# Patient Record
Sex: FEMALE | Race: WHITE | NOT HISPANIC OR LATINO | Employment: OTHER | URBAN - METROPOLITAN AREA
[De-identification: names, ages, dates, MRNs, and addresses within clinical notes are randomized per-mention and may not be internally consistent; named-entity substitution may affect disease eponyms.]

---

## 2019-03-13 PROBLEM — K21.00 GERD WITH ESOPHAGITIS: Status: ACTIVE | Noted: 2019-03-13

## 2019-03-13 PROBLEM — I10 MODERATE HYPERTENSION: Status: ACTIVE | Noted: 2019-03-13

## 2019-03-19 ENCOUNTER — TELEPHONE (OUTPATIENT)
Dept: NEUROSURGERY | Facility: CLINIC | Age: 64
End: 2019-03-19

## 2019-03-19 NOTE — TELEPHONE ENCOUNTER
"Spoke with the pt, expalined to the pt we do have the referral and I would need to schedule an appt with ANKITA Fairchild in order for the proper imaging to be ordered prior to scheduling with Dr. Valentine. The p asked me how much the MRI would cost, I explained I did not know but she could contact her insurance company to discuss. She then asked how much it was to see the PA, then became angry and said "nevermind, just cancel it".    "

## 2019-03-26 ENCOUNTER — INITIAL CONSULT (OUTPATIENT)
Dept: NEUROSURGERY | Facility: CLINIC | Age: 64
End: 2019-03-26
Payer: MEDICARE

## 2019-03-26 VITALS
SYSTOLIC BLOOD PRESSURE: 147 MMHG | BODY MASS INDEX: 36.46 KG/M2 | HEART RATE: 75 BPM | DIASTOLIC BLOOD PRESSURE: 91 MMHG | TEMPERATURE: 99 F | HEIGHT: 66 IN | WEIGHT: 226.88 LBS

## 2019-03-26 DIAGNOSIS — M54.16 LUMBAR RADICULOPATHY: Primary | ICD-10-CM

## 2019-03-26 PROCEDURE — 1101F PT FALLS ASSESS-DOCD LE1/YR: CPT | Mod: S$GLB,,, | Performed by: PHYSICIAN ASSISTANT

## 2019-03-26 PROCEDURE — 1101F PR PT FALLS ASSESS DOC 0-1 FALLS W/OUT INJ PAST YR: ICD-10-PCS | Mod: S$GLB,,, | Performed by: PHYSICIAN ASSISTANT

## 2019-03-26 PROCEDURE — 3008F PR BODY MASS INDEX (BMI) DOCUMENTED: ICD-10-PCS | Mod: S$GLB,,, | Performed by: PHYSICIAN ASSISTANT

## 2019-03-26 PROCEDURE — 99204 PR OFFICE/OUTPT VISIT, NEW, LEVL IV, 45-59 MIN: ICD-10-PCS | Mod: S$GLB,,, | Performed by: PHYSICIAN ASSISTANT

## 2019-03-26 PROCEDURE — 99999 PR PBB SHADOW E&M-EST. PATIENT-LVL IV: CPT | Mod: PBBFAC,,, | Performed by: PHYSICIAN ASSISTANT

## 2019-03-26 PROCEDURE — 3008F BODY MASS INDEX DOCD: CPT | Mod: S$GLB,,, | Performed by: PHYSICIAN ASSISTANT

## 2019-03-26 PROCEDURE — 3080F DIAST BP >= 90 MM HG: CPT | Mod: S$GLB,,, | Performed by: PHYSICIAN ASSISTANT

## 2019-03-26 PROCEDURE — 3077F PR MOST RECENT SYSTOLIC BLOOD PRESSURE >= 140 MM HG: ICD-10-PCS | Mod: S$GLB,,, | Performed by: PHYSICIAN ASSISTANT

## 2019-03-26 PROCEDURE — 3080F PR MOST RECENT DIASTOLIC BLOOD PRESSURE >= 90 MM HG: ICD-10-PCS | Mod: S$GLB,,, | Performed by: PHYSICIAN ASSISTANT

## 2019-03-26 PROCEDURE — 99204 OFFICE O/P NEW MOD 45 MIN: CPT | Mod: S$GLB,,, | Performed by: PHYSICIAN ASSISTANT

## 2019-03-26 PROCEDURE — 3077F SYST BP >= 140 MM HG: CPT | Mod: S$GLB,,, | Performed by: PHYSICIAN ASSISTANT

## 2019-03-26 PROCEDURE — 99999 PR PBB SHADOW E&M-EST. PATIENT-LVL IV: ICD-10-PCS | Mod: PBBFAC,,, | Performed by: PHYSICIAN ASSISTANT

## 2019-03-26 NOTE — LETTER
March 29, 2019      Wilian Sutton, DO  [address]  [address]  [place] [address]           [facility]  [address] [facility] [address] [place] [address]  Phone: [phone]  Fax: [phone]          Patient: Sharon A Bosworth   MR Number: [id number]   YOB: 1955   Date of Visit: 3/26/2019       Dear Dr. Wilian Sutton:    Thank you for referring Sharon Bosworth to me for evaluation. Attached you will find relevant portions of my assessment and plan of care.    If you have questions, please do not hesitate to call me. I look forward to following Sharon Bosworth along with you.    Sincerely,    Zackary Jerome  CC:  No Recipients    If you would like to receive this communication electronically, please contact [email or web address] or [phone] to request more information on [facility] access.    For providers and/or their staff who would like to refer a patient to [facility], please contact us through our one-stop-shop provider referral line, Natalie Marsh, at [phone].    If you feel you have received this communication in error or would no longer like to receive these types of communications, please e-mail [email or web address]

## 2019-04-05 NOTE — PROGRESS NOTES
Neurosurgery History & Physical    Patient ID: Sharon A Bosworth is a 63 y.o. female.    Chief Complaint   Patient presents with    Lumbar Spine Pain (L-Spine)     Long history of worsening low back pain noting pain to her RLE mostly in the thigh.        Review of Systems   Constitutional: Negative for chills, diaphoresis, fatigue and fever.   HENT: Negative for congestion, ear pain, rhinorrhea, sneezing, sore throat and tinnitus.    Eyes: Negative for photophobia, pain, redness and visual disturbance.   Respiratory: Negative for cough, chest tightness, shortness of breath and wheezing.    Cardiovascular: Negative for chest pain, palpitations and leg swelling.   Gastrointestinal: Negative for abdominal distention, abdominal pain, constipation, diarrhea, nausea and vomiting.   Genitourinary: Negative for difficulty urinating, dysuria, frequency and urgency.   Musculoskeletal: Positive for back pain. Negative for gait problem, myalgias and neck pain.   Skin: Negative for pallor and rash.   Neurological: Negative for dizziness, seizures, speech difficulty, weakness, numbness and headaches.   Psychiatric/Behavioral: Negative for confusion and hallucinations.   + right leg pain    History reviewed. No pertinent past medical history.  Social History     Socioeconomic History    Marital status: Single     Spouse name: Not on file    Number of children: Not on file    Years of education: Not on file    Highest education level: Not on file   Occupational History    Not on file   Social Needs    Financial resource strain: Not on file    Food insecurity:     Worry: Not on file     Inability: Not on file    Transportation needs:     Medical: Not on file     Non-medical: Not on file   Tobacco Use    Smoking status: Never Smoker    Smokeless tobacco: Never Used   Substance and Sexual Activity    Alcohol use: Yes     Alcohol/week: 0.0 oz     Comment: Socially    Drug use: No    Sexual activity: Not on file  "  Lifestyle    Physical activity:     Days per week: Not on file     Minutes per session: Not on file    Stress: Not on file   Relationships    Social connections:     Talks on phone: Not on file     Gets together: Not on file     Attends Oriental orthodox service: Not on file     Active member of club or organization: Not on file     Attends meetings of clubs or organizations: Not on file     Relationship status: Not on file   Other Topics Concern    Not on file   Social History Narrative    Not on file     Family History   Problem Relation Age of Onset    Hypertension Mother     Hypertension Brother     Breast cancer Sister 68     Review of patient's allergies indicates:  No Known Allergies    Current Outpatient Medications:     chlorthalidone (HYGROTEN) 50 MG Tab, Take 25 mg by mouth once daily., Disp: , Rfl:     fluticasone (FLONASE) 50 mcg/actuation nasal spray, 1 spray (50 mcg total) by Each Nare route 2 (two) times daily as needed for Rhinitis., Disp: 1 Bottle, Rfl: 4    HYDROcodone-acetaminophen (NORCO)  mg per tablet, Tab 1/2 to 1 po every 8 hours prn back/right leg pain.  May cause drowsiness/confusion.  Take with food., Disp: 20 tablet, Rfl: 0    losartan (COZAAR) 100 MG tablet, Take 1 tablet (100 mg total) by mouth once daily., Disp: 90 tablet, Rfl: 3    pantoprazole (PROTONIX) 40 MG tablet, Take 1 tablet (40 mg total) by mouth once daily., Disp: 90 tablet, Rfl: 3  Blood pressure (!) 147/91, pulse 75, temperature 98.6 °F (37 °C), temperature source Oral, height 5' 6" (1.676 m), weight 102.9 kg (226 lb 13.7 oz).      Neurologic Exam     Mental Status   Oriented to person, place, and time.   Oriented to person.   Oriented to place.   Oriented to time.   Follows 3 step commands.   Attention: normal. Concentration: normal.   Speech: speech is normal   Level of consciousness: alert  Knowledge: consistent with education.   Able to name object. Able to read. Able to repeat. Able to write. Normal " comprehension.      Cranial Nerves      CN II   Visual acuity: normal  Right visual field deficit: none  Left visual field deficit: none      CN III, IV, VI   Pupils are equal, round, and reactive to light.  Right pupil: Size: 3 mm. Shape: regular. Reactivity: brisk. Consensual response: intact.   Left pupil: Size: 3 mm. Shape: regular. Reactivity: brisk. Consensual response: intact.   CN III: no CN III palsy  CN VI: no CN VI palsy  Nystagmus: none   Diplopia: none  Ophthalmoparesis: none  Conjugate gaze: present     CN V   Right facial sensation deficit: none  Left facial sensation deficit: none     CN VII   Right facial weakness: none  Left facial weakness: none     CN VIII   Hearing: intact     CN IX, X   CN IX normal.   CN X normal.      CN XI   Right sternocleidomastoid strength: normal  Left sternocleidomastoid strength: normal  Right trapezius strength: normal  Left trapezius strength: normal     CN XII   Fasciculations: absent  Tongue deviation: none     Motor Exam   Muscle bulk: normal  Overall muscle tone: normal  Right arm pronator drift: absent  Left arm pronator drift: absent     Strength   Right deltoid: 5/5  Left deltoid: 5/5  Right biceps: 5/5  Left biceps: 5/5  Right triceps: 5/5  Left triceps: 5/5  Right wrist flexion: 5/5  Left wrist flexion: 5/5  Right wrist extension: 5/5  Left wrist extension: 5/5  Right interossei: 5/5  Left interossei: 5/5  Right iliopsoas: 5/5  Left iliopsoas: 5/5  Right quadriceps: 5/5  Left quadriceps: 5/5  Right hamstrin/5  Left hamstrin/5  Right anterior tibial: 5/5  Left anterior tibial: 5/5  Right posterior tibial: 5/5  Left posterior tibial: 5/5  Right peroneal: 5/5  Left peroneal: 5/5  Right gastroc: 5/5  Left gastroc: 5/5     Sensory Exam   Right arm light touch: normal  Left arm light touch: normal  Right leg light touch: normal  Left leg light touch: normal     Gait, Coordination, and Reflexes      Gait  Gait: normal      Coordination   Romberg:  negative  Finger to nose coordination: normal  Heel to shin coordination: normal  Tandem walking coordination: normal     Tremor   Resting tremor: absent  Intention tremor: absent  Action tremor: absent     Reflexes   Right brachioradialis: 2+  Left brachioradialis: 2+  Right biceps: 2+  Left biceps: 2+  Right triceps: 2+  Left triceps: 2+  Right patellar: 2+  Left patellar: 2+  Right achilles: 1+  Left achilles: 1+  Right Keller: absent  Left Keller: absent  Right ankle clonus: absent  Left ankle clonus: absent    Physical Exam  Constitutional: Oriented to person, place, and time. Appears well-developed and well-nourished.   HENT:   Head: Normocephalic and atraumatic.   Eyes: Pupils are equal, round, and reactive to light.   Neck: Normal range of motion. Neck supple.   Cardiovascular: Normal rate.    Pulmonary/Chest: Effort normal.   Musculoskeletal: Normal range of motion. Exhibits no edema.   Neurological: Alert and oriented to person, place, and time. Normal Finger-Nose-Finger Test, a normal Heel to Shin Test, a normal Romberg Test and a normal Tandem Gait Test. Gait normal.   Reflex Scores:       Tricep reflexes are 2+ on the right side and 2+ on the left side.       Bicep reflexes are 2+ on the right side and 2+ on the left side.       Brachioradialis reflexes are 2+ on the right side and 2+ on the left side.       Patellar reflexes are 2+ on the right side and 2+ on the left side.       Achilles reflexes are 1+ on the right side and 1+ on the left side.  Skin: Skin is warm, dry and intact.   Psychiatric: Normal mood and affect. Speech is normal and behavior is normal. Judgment and thought content normal.   Nursing note and vitals reviewed.     Oswestry Disability Index score: 38%    Patient Health Questionnaire score: 9    Provider dictation:    Ms. Sharon Bosworth is a 63 year old right hand dominant female who presents for neurosurgical consultation referred by Dr. Sutton for evaluation of lower back pain.  Patient reports severe lower back pain that started on 3/11. She was seen at [facility] and x-rays were done. She was sent home. Pain was not improving and she was then seen by Dr. Sutton on 3/13. She was provided Norco, Flexeril, and a medrol dose pack. Patient was referred here for further work-up. She reports mild relief with medications. Currently she has lower back pain that shoots into the right anterior/lateral thigh. She also reports spasms. Denies lower extremity weakness or numbness. Denies bowel/bladder incontinence. Denies prior PT or TARAH for the pain.     On exam patient has full strength and no sensory deficits.     No imaging to review.     Ms. Bosworth presents with new onset of lower back pain and right leg pain about 3 weeks ago. I am ordering an MRI Lumbar spine and x-rays with flex/ext to further assess. She will follow-up with me once imaging is complete. Patient was informed to contact the clinic with any further questions or concerns.     1. Lumbar radiculopathy  MRI Lumbar Spine Without Contrast    X-Ray Lumbar Complete With Flex And Ext

## 2019-04-10 ENCOUNTER — HOSPITAL ENCOUNTER (OUTPATIENT)
Dept: RADIOLOGY | Facility: HOSPITAL | Age: 64
Discharge: HOME | End: 2019-04-10
Attending: PHYSICIAN ASSISTANT
Payer: MEDICARE

## 2019-04-10 DIAGNOSIS — M54.16 LUMBAR RADICULOPATHY: ICD-10-CM

## 2019-04-10 PROCEDURE — 72110 XR LUMBAR SPINE 5 VIEW WITH FLEX AND EXT: ICD-10-PCS | Mod: 26,,, | Performed by: RADIOLOGY

## 2019-04-10 PROCEDURE — 72110 X-RAY EXAM L-2 SPINE 4/>VWS: CPT | Mod: 26,,, | Performed by: RADIOLOGY

## 2019-04-10 PROCEDURE — 72148 MRI LUMBAR SPINE W/O DYE: CPT | Mod: TC,PO

## 2019-04-10 PROCEDURE — 72148 MRI LUMBAR SPINE WITHOUT CONTRAST: ICD-10-PCS | Mod: 26,,, | Performed by: RADIOLOGY

## 2019-04-10 PROCEDURE — 72110 X-RAY EXAM L-2 SPINE 4/>VWS: CPT | Mod: TC,FY,PO

## 2019-04-10 PROCEDURE — 72148 MRI LUMBAR SPINE W/O DYE: CPT | Mod: 26,,, | Performed by: RADIOLOGY

## 2019-04-15 ENCOUNTER — OFFICE VISIT (OUTPATIENT)
Dept: NEUROSURGERY | Facility: CLINIC | Age: 64
End: 2019-04-15
Payer: MEDICARE

## 2019-04-15 VITALS
HEART RATE: 80 BPM | DIASTOLIC BLOOD PRESSURE: 87 MMHG | TEMPERATURE: 98 F | HEIGHT: 66 IN | SYSTOLIC BLOOD PRESSURE: 142 MMHG | WEIGHT: 229.06 LBS | BODY MASS INDEX: 36.81 KG/M2

## 2019-04-15 DIAGNOSIS — M47.816 LUMBAR SPONDYLOSIS: Primary | ICD-10-CM

## 2019-04-15 PROCEDURE — 3077F SYST BP >= 140 MM HG: CPT | Mod: S$GLB,,, | Performed by: PHYSICIAN ASSISTANT

## 2019-04-15 PROCEDURE — 3008F BODY MASS INDEX DOCD: CPT | Mod: S$GLB,,, | Performed by: PHYSICIAN ASSISTANT

## 2019-04-15 PROCEDURE — 1101F PR PT FALLS ASSESS DOC 0-1 FALLS W/OUT INJ PAST YR: ICD-10-PCS | Mod: S$GLB,,, | Performed by: PHYSICIAN ASSISTANT

## 2019-04-15 PROCEDURE — 1101F PT FALLS ASSESS-DOCD LE1/YR: CPT | Mod: S$GLB,,, | Performed by: PHYSICIAN ASSISTANT

## 2019-04-15 PROCEDURE — 3077F PR MOST RECENT SYSTOLIC BLOOD PRESSURE >= 140 MM HG: ICD-10-PCS | Mod: S$GLB,,, | Performed by: PHYSICIAN ASSISTANT

## 2019-04-15 PROCEDURE — 99999 PR PBB SHADOW E&M-EST. PATIENT-LVL IV: ICD-10-PCS | Mod: PBBFAC,,, | Performed by: PHYSICIAN ASSISTANT

## 2019-04-15 PROCEDURE — 3008F PR BODY MASS INDEX (BMI) DOCUMENTED: ICD-10-PCS | Mod: S$GLB,,, | Performed by: PHYSICIAN ASSISTANT

## 2019-04-15 PROCEDURE — 99214 PR OFFICE/OUTPT VISIT, EST, LEVL IV, 30-39 MIN: ICD-10-PCS | Mod: S$GLB,,, | Performed by: PHYSICIAN ASSISTANT

## 2019-04-15 PROCEDURE — 3079F DIAST BP 80-89 MM HG: CPT | Mod: S$GLB,,, | Performed by: PHYSICIAN ASSISTANT

## 2019-04-15 PROCEDURE — 99999 PR PBB SHADOW E&M-EST. PATIENT-LVL IV: CPT | Mod: PBBFAC,,, | Performed by: PHYSICIAN ASSISTANT

## 2019-04-15 PROCEDURE — 99214 OFFICE O/P EST MOD 30 MIN: CPT | Mod: S$GLB,,, | Performed by: PHYSICIAN ASSISTANT

## 2019-04-15 PROCEDURE — 3079F PR MOST RECENT DIASTOLIC BLOOD PRESSURE 80-89 MM HG: ICD-10-PCS | Mod: S$GLB,,, | Performed by: PHYSICIAN ASSISTANT

## 2019-04-17 NOTE — PROGRESS NOTES
Neurosurgery History & Physical    Patient ID: Sharon A Bosworth is a 63 y.o. female.    Chief Complaint   Patient presents with    Follow-up     LBP follow up with MRI. Denies changes since last visit.        Review of Systems  Constitutional: Negative for chills, diaphoresis, fatigue and fever.   HENT: Negative for congestion, ear pain, rhinorrhea, sneezing, sore throat and tinnitus.    Eyes: Negative for photophobia, pain, redness and visual disturbance.   Respiratory: Negative for cough, chest tightness, shortness of breath and wheezing.    Cardiovascular: Negative for chest pain, palpitations and leg swelling.   Gastrointestinal: Negative for abdominal distention, abdominal pain, constipation, diarrhea, nausea and vomiting.   Genitourinary: Negative for difficulty urinating, dysuria, frequency and urgency.   Musculoskeletal: Positive for back pain. Negative for gait problem, myalgias and neck pain.   Skin: Negative for pallor and rash.   Neurological: Negative for dizziness, seizures, speech difficulty, weakness, numbness and headaches.   Psychiatric/Behavioral: Negative for confusion and hallucinations.   + right leg pain    History reviewed. No pertinent past medical history.  Social History     Socioeconomic History    Marital status: Single     Spouse name: Not on file    Number of children: Not on file    Years of education: Not on file    Highest education level: Not on file   Occupational History    Not on file   Social Needs    Financial resource strain: Not on file    Food insecurity:     Worry: Not on file     Inability: Not on file    Transportation needs:     Medical: Not on file     Non-medical: Not on file   Tobacco Use    Smoking status: Never Smoker    Smokeless tobacco: Never Used   Substance and Sexual Activity    Alcohol use: Yes     Alcohol/week: 0.0 oz     Comment: Socially    Drug use: No    Sexual activity: Not on file   Lifestyle    Physical activity:     Days per week:  "Not on file     Minutes per session: Not on file    Stress: Not on file   Relationships    Social connections:     Talks on phone: Not on file     Gets together: Not on file     Attends Faith service: Not on file     Active member of club or organization: Not on file     Attends meetings of clubs or organizations: Not on file     Relationship status: Not on file   Other Topics Concern    Not on file   Social History Narrative    Not on file     Family History   Problem Relation Age of Onset    Hypertension Mother     Hypertension Brother     Breast cancer Sister 68     Review of patient's allergies indicates:  No Known Allergies    Current Outpatient Medications:     chlorthalidone (HYGROTEN) 50 MG Tab, Take 25 mg by mouth once daily., Disp: , Rfl:     fluticasone (FLONASE) 50 mcg/actuation nasal spray, 1 spray (50 mcg total) by Each Nare route 2 (two) times daily as needed for Rhinitis., Disp: 1 Bottle, Rfl: 4    HYDROcodone-acetaminophen (NORCO)  mg per tablet, Tab 1/2 to 1 po every 8 hours prn back/right leg pain.  May cause drowsiness/confusion.  Take with food., Disp: 20 tablet, Rfl: 0    losartan (COZAAR) 100 MG tablet, Take 1 tablet (100 mg total) by mouth once daily., Disp: 90 tablet, Rfl: 3    pantoprazole (PROTONIX) 40 MG tablet, Take 1 tablet (40 mg total) by mouth once daily., Disp: 90 tablet, Rfl: 3  Blood pressure (!) 142/87, pulse 80, temperature 98.2 °F (36.8 °C), temperature source Oral, height 5' 6" (1.676 m), weight 103.9 kg (229 lb 0.9 oz).      Neurologic Exam  Mental Status   Oriented to person, place, and time.   Oriented to person.   Oriented to place.   Oriented to time.   Follows 3 step commands.   Attention: normal. Concentration: normal.   Speech: speech is normal   Level of consciousness: alert  Knowledge: consistent with education.   Able to name object. Able to read. Able to repeat. Able to write. Normal comprehension.      Cranial Nerves      CN II   Visual " acuity: normal  Right visual field deficit: none  Left visual field deficit: none      CN III, IV, VI   Pupils are equal, round, and reactive to light.  Right pupil: Size: 3 mm. Shape: regular. Reactivity: brisk. Consensual response: intact.   Left pupil: Size: 3 mm. Shape: regular. Reactivity: brisk. Consensual response: intact.   CN III: no CN III palsy  CN VI: no CN VI palsy  Nystagmus: none   Diplopia: none  Ophthalmoparesis: none  Conjugate gaze: present     CN V   Right facial sensation deficit: none  Left facial sensation deficit: none     CN VII   Right facial weakness: none  Left facial weakness: none     CN VIII   Hearing: intact     CN IX, X   CN IX normal.   CN X normal.      CN XI   Right sternocleidomastoid strength: normal  Left sternocleidomastoid strength: normal  Right trapezius strength: normal  Left trapezius strength: normal     CN XII   Fasciculations: absent  Tongue deviation: none     Motor Exam   Muscle bulk: normal  Overall muscle tone: normal  Right arm pronator drift: absent  Left arm pronator drift: absent     Strength   Right deltoid: 5/5  Left deltoid: 5/5  Right biceps: 5/5  Left biceps: 5/5  Right triceps: 5/5  Left triceps: 5/5  Right wrist flexion: 5/5  Left wrist flexion: 5/5  Right wrist extension: 5/5  Left wrist extension: 5/5  Right interossei: 5/5  Left interossei: 5/5  Right iliopsoas: 5/5  Left iliopsoas: 5/5  Right quadriceps: 5/5  Left quadriceps: 5/5  Right hamstrin/5  Left hamstrin/5  Right anterior tibial: 5/5  Left anterior tibial: 5/5  Right posterior tibial: 5/5  Left posterior tibial: 5/5  Right peroneal: 5/5  Left peroneal: 5/5  Right gastroc: 5/5  Left gastroc: 5/5     Sensory Exam   Right arm light touch: normal  Left arm light touch: normal  Right leg light touch: hyperesthesia anterior/lateral thigh  Left leg light touch: normal     Gait, Coordination, and Reflexes      Gait  Gait: normal      Coordination   Romberg: negative  Finger to nose  coordination: normal  Heel to shin coordination: normal  Tandem walking coordination: normal     Tremor   Resting tremor: absent  Intention tremor: absent  Action tremor: absent     Reflexes   Right brachioradialis: 2+  Left brachioradialis: 2+  Right biceps: 2+  Left biceps: 2+  Right triceps: 2+  Left triceps: 2+  Right patellar: 2+  Left patellar: 2+  Right achilles: 1+  Left achilles: 1+  Right Keller: absent  Left Keller: absent  Right ankle clonus: absent  Left ankle clonus: absent    Physical Exam  Constitutional: Oriented to person, place, and time. Appears well-developed and well-nourished.   HENT:   Head: Normocephalic and atraumatic.   Eyes: Pupils are equal, round, and reactive to light.   Neck: Normal range of motion. Neck supple.   Cardiovascular: Normal rate.    Pulmonary/Chest: Effort normal.   Musculoskeletal: Normal range of motion. Exhibits no edema.   Neurological: Alert and oriented to person, place, and time. Normal Finger-Nose-Finger Test, a normal Heel to Shin Test, a normal Romberg Test and a normal Tandem Gait Test. Gait normal.   Reflex Scores:       Tricep reflexes are 2+ on the right side and 2+ on the left side.       Bicep reflexes are 2+ on the right side and 2+ on the left side.       Brachioradialis reflexes are 2+ on the right side and 2+ on the left side.       Patellar reflexes are 2+ on the right side and 2+ on the left side.       Achilles reflexes are 1+ on the right side and 1+ on the left side.  Skin: Skin is warm, dry and intact.   Psychiatric: Normal mood and affect. Speech is normal and behavior is normal. Judgment and thought content normal.   Nursing note and vitals reviewed.     Oswestry Disability Index score: 38%    Patient Health Questionnaire score: 9    Provider dictation:    Ms. Sharon Bosworth is a 63 year old right hand dominant female who presents for neurosurgical follow-up of lower back pain. I initially saw her in clinic on 3/26/2019. At that time patient  reported severe lower back pain that started on 3/11. She was seen at [facility] and x-rays were done. She was sent home. Pain was not improving and she was then seen by Dr. Sutton on 3/13. She was provided Norco, Flexeril, and a medrol dose pack. Patient reports lower back pain that shoots into the right anterior/lateral thigh. Pain has worsened since last visit. She also reports spasms. Denies lower extremity weakness or numbness. Denies bowel/bladder incontinence. Denies prior PT or TARAH for the pain. I ordered an MRI Lumbar spine and flex/ext x-rays. Patient is here today for follow-up to review imaging.      On exam patient has full strength. Hyperesthesia in right anterior/lateral thigh.     MR Lumbar spine personally reviewed and shows L4-5 disc bulge, facet arthropathy, and ligamentum flavum hypertrophy causing mild central and mild bilateral foraminal stenosis.   X-ray lumbar spine with flex/ext personally reviewed and shows no abnormal motion with flex/ext views.      Ms. Bosworth presents with worsening leg pain and imaging shows mild lumbar spondylosis at L4-5. I am referring her to physical therapy and pain management for evaluation and treatment. No surgical intervention recommended at this time. I will also obtain an EMG/NCS of bilateral lower extremities and will call her with the results once completed. She will follow-up on a prn basis. Patient was informed to call the clinic with any further questions or concerns.       1. Lumbar spondylosis  Ambulatory Referral to Physical/Occupational Therapy    EMG W/ ULTRASOUND AND NERVE CONDUCTION TEST 2 Extremities    Ambulatory consult to Pain Clinic

## 2019-05-14 ENCOUNTER — OFFICE VISIT (OUTPATIENT)
Dept: PAIN MEDICINE | Facility: CLINIC | Age: 64
End: 2019-05-14
Payer: MEDICARE

## 2019-05-14 VITALS
WEIGHT: 229.06 LBS | DIASTOLIC BLOOD PRESSURE: 68 MMHG | HEIGHT: 66 IN | SYSTOLIC BLOOD PRESSURE: 106 MMHG | BODY MASS INDEX: 36.81 KG/M2 | HEART RATE: 75 BPM

## 2019-05-14 DIAGNOSIS — M51.36 DDD (DEGENERATIVE DISC DISEASE), LUMBAR: ICD-10-CM

## 2019-05-14 DIAGNOSIS — M54.16 LUMBAR RADICULOPATHY: Primary | ICD-10-CM

## 2019-05-14 PROCEDURE — 99204 PR OFFICE/OUTPT VISIT, NEW, LEVL IV, 45-59 MIN: ICD-10-PCS | Mod: S$GLB,,, | Performed by: ANESTHESIOLOGY

## 2019-05-14 PROCEDURE — 3008F BODY MASS INDEX DOCD: CPT | Mod: S$GLB,,, | Performed by: ANESTHESIOLOGY

## 2019-05-14 PROCEDURE — 1101F PR PT FALLS ASSESS DOC 0-1 FALLS W/OUT INJ PAST YR: ICD-10-PCS | Mod: S$GLB,,, | Performed by: ANESTHESIOLOGY

## 2019-05-14 PROCEDURE — 99204 OFFICE O/P NEW MOD 45 MIN: CPT | Mod: S$GLB,,, | Performed by: ANESTHESIOLOGY

## 2019-05-14 PROCEDURE — 99999 PR PBB SHADOW E&M-EST. PATIENT-LVL IV: ICD-10-PCS | Mod: PBBFAC,,, | Performed by: ANESTHESIOLOGY

## 2019-05-14 PROCEDURE — 3078F DIAST BP <80 MM HG: CPT | Mod: S$GLB,,, | Performed by: ANESTHESIOLOGY

## 2019-05-14 PROCEDURE — 3074F PR MOST RECENT SYSTOLIC BLOOD PRESSURE < 130 MM HG: ICD-10-PCS | Mod: S$GLB,,, | Performed by: ANESTHESIOLOGY

## 2019-05-14 PROCEDURE — 99999 PR PBB SHADOW E&M-EST. PATIENT-LVL IV: CPT | Mod: PBBFAC,,, | Performed by: ANESTHESIOLOGY

## 2019-05-14 PROCEDURE — 3074F SYST BP LT 130 MM HG: CPT | Mod: S$GLB,,, | Performed by: ANESTHESIOLOGY

## 2019-05-14 PROCEDURE — 3078F PR MOST RECENT DIASTOLIC BLOOD PRESSURE < 80 MM HG: ICD-10-PCS | Mod: S$GLB,,, | Performed by: ANESTHESIOLOGY

## 2019-05-14 PROCEDURE — 3008F PR BODY MASS INDEX (BMI) DOCUMENTED: ICD-10-PCS | Mod: S$GLB,,, | Performed by: ANESTHESIOLOGY

## 2019-05-14 PROCEDURE — 1101F PT FALLS ASSESS-DOCD LE1/YR: CPT | Mod: S$GLB,,, | Performed by: ANESTHESIOLOGY

## 2019-05-14 RX ORDER — GABAPENTIN 300 MG/1
300 CAPSULE ORAL 3 TIMES DAILY
Qty: 90 CAPSULE | Refills: 1 | Status: SHIPPED | OUTPATIENT
Start: 2019-05-14 | End: 2019-05-14

## 2019-05-14 RX ORDER — GABAPENTIN 300 MG/1
300 CAPSULE ORAL NIGHTLY
Qty: 30 CAPSULE | Refills: 1 | Status: SHIPPED | OUTPATIENT
Start: 2019-05-14 | End: 2020-03-02

## 2019-05-14 RX ORDER — ALPRAZOLAM 0.5 MG/1
1 TABLET, ORALLY DISINTEGRATING ORAL ONCE AS NEEDED
Status: CANCELLED | OUTPATIENT
Start: 2019-05-29 | End: 2030-10-24

## 2019-05-14 NOTE — H&P (VIEW-ONLY)
This note was completed with dictation software and grammatical errors may exist.    CC: Back pain, right anterior thigh pain    HPI:  The patient is a 63-year-old woman with a history of hypertension, presents in referral [facility] for back and right anterior thigh pain. The patient reports that her thigh pain began 2-3 months ago out of the blue, denies any trauma or incidents which may have caused the pain. It has become more constant and more severe.  It is located across the anterior thigh from the lateral hip down to the knee and she describes it as burning, throbbing, aching, numb, sharp, shooting and tingling.  She states that it is fairly constant and it does not seem that anything specifically makes it better or worse but she does report slight worsening at times with sitting too long, standing too long, lying down too long or with walking.  While she denies any major numbness or constant pain below her knee, she does report jolts of pain that will radiate along the anterior thigh to the ankle but not into the foot.  She denies any weakness in the leg.  She was seen by Maine Gomez who had ordered an MRI and since the findings were not all that impressive, she has set her up for any EMG and also sent orders for physical therapy.  The patient does not seem to recall any physical therapy or EMG being set up but I explained to her that she has these appointments.  She seems to have a low health literacy level though.    Pain intervention history:  She had taken hydrocodone without much relief, has tried ibuprofen, Tylenol without relief.  She tried Flexeril without much relief.  She has been doing some exercises on her own at home, trying to walk without relief of pain.    ROS:  She reports fatigability, weight gain, itching, vision change, eye pain, runny nose, shortness of breath, chest pain, cough, stomach pain, easy bruising, back pain, difficulty sleeping and  "anxiety.  Balance of review of systems is negative.    History reviewed. No pertinent past medical history.    Past Surgical History:   Procedure Laterality Date    HYSTERECTOMY         Social History     Socioeconomic History    Marital status: Single     Spouse name: Not on file    Number of children: Not on file    Years of education: Not on file    Highest education level: Not on file   Occupational History    Not on file   Social Needs    Financial resource strain: Not on file    Food insecurity:     Worry: Not on file     Inability: Not on file    Transportation needs:     Medical: Not on file     Non-medical: Not on file   Tobacco Use    Smoking status: Never Smoker    Smokeless tobacco: Never Used   Substance and Sexual Activity    Alcohol use: Yes     Alcohol/week: 0.0 oz     Comment: Socially    Drug use: No    Sexual activity: Not on file   Lifestyle    Physical activity:     Days per week: Not on file     Minutes per session: Not on file    Stress: Not on file   Relationships    Social connections:     Talks on phone: Not on file     Gets together: Not on file     Attends Tenriism service: Not on file     Active member of club or organization: Not on file     Attends meetings of clubs or organizations: Not on file     Relationship status: Not on file   Other Topics Concern    Not on file   Social History Narrative    Not on file         Medications/Allergies: See med card    Vitals:    05/14/19 0848   BP: 106/68   Pulse: 75   Weight: 103.9 kg (229 lb 0.9 oz)   Height: 5' 6" (1.676 m)   PainSc:   8   PainLoc: Back     Body mass index is 36.97 kg/m².        Physical exam:  Gen: A and O x3, pleasant, well-groomed  Skin: No rashes or obvious lesions  HEENT: PERRLA, no obvious deformities on ears or in canals. Trachea midline.  CVS: Regular rate and rhythm, normal palpable pulses.  Resp:No increased work of breathing, symmetrical chest rise.  Abdomen: Soft, NT/ND.  Musculoskeletal: . No " antalgic gait.     Neuro:  Lower extremities: 5/5 strength bilaterally, except for right knee extension and hip flexion 4/5 primarily secondary to pain  Reflexes: Patellar 0+, Achilles 0+ bilaterally.  Sensory:  Intact and symmetrical to light touch and pinprick in L2-S1 dermatomes bilaterally except for anterior thigh on the right side decreased sensation to light touch and pinprick but also hyperalgesia in the same area.    Lumbar spine:  Lumbar spine:  Range of motion is full with flexion without increased pain, mildly reduced with extension with increased pain especially on right oblique extension causing right low back pain.  Andre's test causes no increased pain on either side.    Supine straight leg raise is negative bilaterally.    Internal and external rotation of the hip causes no increased pain on either side.  Myofascial exam: No tenderness to palpation across lumbar paraspinous muscles.    Imagin/10/19 MRI L-spine:  The conus terminates at L1 and has an unremarkable appearance.  There is diffuse disc desiccation.  There is loss of disc space height at L2/L3 and L3/L4.  There is mild anterior osteophyte formation throughout the lumbar region.  The lumbar vertebrae otherwise display normal signal, morphology and alignment.  The individual disc levels appears follows:  T12/L1, L1/L2: There is no evidence of disc protrusion, canal or foraminal stenosis at these levels.  L2/L3: Minimal degenerative facet changes are present and there is no evidence of significant canal or foraminal stenosis.  L3/L4: There is slight disc bulging and mild degenerative facet changes are noted without evidence of significant canal or foraminal stenosis.  L4/L5: Annular disc bulging is evident with a small left posterolateral disc protrusion.  There is mild narrowing the left foramen.  The central canal is mildly distorted and the right foramen is mildly narrowed.  L5/S1: Degenerative facet changes are noted and there is  no evidence of significant canal or foraminal stenosis.    4/10/19 Xray L-spine:  Lower lumbar degenerative facet changes are present.  There is mild anterior osteophyte formation throughout the lumbar region.  There is no evidence of abnormal motion upon flexion and extension.  There is a slight retrolisthesis of L2 relative to L3 there is a slight dextroscoliosis.    Assessment:  The patient is a 63-year-old woman with a history of hypertension, presents in referral [facility] for back and right anterior thigh pain.   1. Lumbar radiculopathy  Vital signs    Verify informed consent    Notify physician     Notify physician     Notify physician (specify)    Diet NPO    Case Request Operating Room: Injection,steroid,epidural,transforaminal approach L4/5    Place in Outpatient    alprazolam ODT dissolvable tablet 1 mg   2. DDD (degenerative disc disease), lumbar           Plan:  1.  From her pain symptomatology, I expected to see right-sided L3 nerve root impingement but explained that this is not present.  There is some foraminal narrowing out to the right side and may be even some extra foraminal narrowing but again, not impressive.  I was trying to explain why this can sometimes be difficult to make a diagnosis when she got up and started walking out of the room saying that I was not planning to help her and she would just go somewhere else.  I explained that I just had not yet gotten to the part of the discussion where we I would talk about treatment for this.  I was able to calm her down but this was rather unusual.  2.  She is specifically asking for an injection, does not want any other treatments or tests.  I explained that we could try an L4/5 transforaminal injection on the right side for diagnostic and therapeutic purposes and she would like to proceed with this.  I explained that if she does not get relief with this, it is most likely not coming from the spine and instead  may be a case of meralgia paresthetica.  In the meantime, I do think she would benefit from gabapentin and she agreed to start taking 300 mg nightly.  3.  I explained that the EMG may be helpful but she complains that she does not want to spend any more money on tests.  I will have her follow up in several weeks after the injection or sooner as needed.    Thank you for referring this interesting patient, and I look forward to continuing to collaborate in her care.

## 2019-05-14 NOTE — LETTER
May 14, 2019      Maine Torres PA-C  [address] [facility] [address] [place] [address]           [facility]  [address] [facility] [address] [place] [address]  Phone: [phone]  Fax: [phone]          Patient: Sharon A Bosworth   MR Number: [id number]   YOB: 1955   Date of Visit: 5/14/2019       Dear Maine Torres:    Thank you for referring Sharon Bosworth to me for evaluation. Attached you will find relevant portions of my assessment and plan of care.    If you have questions, please do not hesitate to call me. I look forward to following Sharon Bosworth along with you.    Sincerely,    Richy Bolden MD    Enclosure  CC:  No Recipients    If you would like to receive this communication electronically, please contact [email or web address] or [phone] to request more information on [facility] access.    For providers and/or their staff who would like to refer a patient to [facility], please contact us through our one-stop-shop provider referral line, [facility], at [phone].    If you feel you have received this communication in error or would no longer like to receive these types of communications, please e-mail [email or web address]

## 2019-05-17 ENCOUNTER — TELEPHONE (OUTPATIENT)
Dept: PAIN MEDICINE | Facility: CLINIC | Age: 64
End: 2019-05-17

## 2019-05-17 NOTE — TELEPHONE ENCOUNTER
Spoke with pharmacist and verified that this the 300mg once nightly is the correct prescription. They will place the other one on hold for now.

## 2019-05-17 NOTE — TELEPHONE ENCOUNTER
----- Message from Kizzy Khalil sent at 5/17/2019  9:33 AM CDT -----  Contact: Trinity  Pharmacist - [facility] with [facility] - [phone] is calling/Two prescriptions were sent over for Gabapentin with two different directions/does Dr Bolden want patient to take 1 tablet in the evening or 1 three times daily?/please advise

## 2019-05-24 ENCOUNTER — TELEPHONE (OUTPATIENT)
Dept: NEUROLOGY | Facility: CLINIC | Age: 64
End: 2019-05-24

## 2019-05-24 NOTE — TELEPHONE ENCOUNTER
"Called pt to reschedule EMG appt that pt states that she was unaware of in the first place; pt yelling and very upset states that I am screaming at her in the phone; notified her that I, indeed, was not screaming; pt questioning "who the hell is scheduling me for all these damn tests;  I don't know anything about this"; notified pt that the test was ordered and scheduled by neurosurgery; pt continues to scream at me and blaming me for screaming, when I was not even speaking; pt using foul language and being vulgar; pt finalized conversation stating "i'm not even going to call you what I really want to call you but you're smart enough to know; maybe not though; call me Monday" and hung up.   "

## 2019-05-24 NOTE — TELEPHONE ENCOUNTER
----- Message from Shad Wu sent at 5/24/2019 12:18 PM CDT -----  Contact: same  Patient called in and would like a call back to reschedule her EMG at 10am.  Patient stated she was not aware of this test & would like a call at [phone]

## 2019-05-28 ENCOUNTER — TELEPHONE (OUTPATIENT)
Dept: NEUROLOGY | Facility: CLINIC | Age: 64
End: 2019-05-28

## 2019-05-28 ENCOUNTER — TELEPHONE (OUTPATIENT)
Dept: NEUROSURGERY | Facility: CLINIC | Age: 64
End: 2019-05-28

## 2019-05-28 DIAGNOSIS — M51.36 DDD (DEGENERATIVE DISC DISEASE), LUMBAR: Primary | ICD-10-CM

## 2019-05-28 NOTE — TELEPHONE ENCOUNTER
----- Message from Mercedes Mccoy sent at 5/28/2019  9:50 AM CDT -----  Contact: Aline yang  Type: Needs Medical Advice    Who Called:  Aline  Best Call Back Number: [phone]  Additional Information: Pt was not aware of her appt in may. She wants to speak to  and navarro. Pls call to adv

## 2019-05-28 NOTE — TELEPHONE ENCOUNTER
Spoke with patient today and explained why we ordered an EMG. Advised her that she would need to be rescheduled for this with neurology and that we would call her with the results.     Please call her to get rescheduled.

## 2019-05-29 ENCOUNTER — HOSPITAL ENCOUNTER (OUTPATIENT)
Facility: HOSPITAL | Age: 64
Discharge: HOME | End: 2019-05-29
Attending: ANESTHESIOLOGY | Admitting: ANESTHESIOLOGY
Payer: MEDICARE

## 2019-05-29 ENCOUNTER — TELEPHONE (OUTPATIENT)
Dept: NEUROLOGY | Facility: CLINIC | Age: 64
End: 2019-05-29

## 2019-05-29 ENCOUNTER — HOSPITAL ENCOUNTER (OUTPATIENT)
Dept: RADIOLOGY | Facility: HOSPITAL | Age: 64
Discharge: HOME | End: 2019-05-29
Attending: ANESTHESIOLOGY | Admitting: ANESTHESIOLOGY
Payer: MEDICARE

## 2019-05-29 VITALS
DIASTOLIC BLOOD PRESSURE: 60 MMHG | BODY MASS INDEX: 36.16 KG/M2 | OXYGEN SATURATION: 96 % | TEMPERATURE: 98 F | RESPIRATION RATE: 18 BRPM | WEIGHT: 225 LBS | HEART RATE: 74 BPM | HEIGHT: 66 IN | SYSTOLIC BLOOD PRESSURE: 132 MMHG

## 2019-05-29 DIAGNOSIS — M54.16 LUMBAR RADICULOPATHY: Primary | ICD-10-CM

## 2019-05-29 DIAGNOSIS — M51.36 DDD (DEGENERATIVE DISC DISEASE), LUMBAR: ICD-10-CM

## 2019-05-29 PROCEDURE — 76000 FLUOROSCOPY <1 HR PHYS/QHP: CPT | Mod: TC,PO

## 2019-05-29 PROCEDURE — 64483 PR EPIDURAL INJ, ANES/STEROID, TRANSFORAMINAL, LUMB/SACR, SNGL LEVL: ICD-10-PCS | Mod: RT,,, | Performed by: ANESTHESIOLOGY

## 2019-05-29 PROCEDURE — 64483 NJX AA&/STRD TFRM EPI L/S 1: CPT | Mod: PO | Performed by: ANESTHESIOLOGY

## 2019-05-29 PROCEDURE — 64483 NJX AA&/STRD TFRM EPI L/S 1: CPT | Mod: RT,,, | Performed by: ANESTHESIOLOGY

## 2019-05-29 PROCEDURE — 25000003 PHARM REV CODE 250: Mod: PO | Performed by: ANESTHESIOLOGY

## 2019-05-29 PROCEDURE — 25500020 PHARM REV CODE 255: Mod: PO | Performed by: ANESTHESIOLOGY

## 2019-05-29 PROCEDURE — 63600175 PHARM REV CODE 636 W HCPCS: Mod: PO | Performed by: ANESTHESIOLOGY

## 2019-05-29 RX ORDER — METHYLPREDNISOLONE ACETATE 40 MG/ML
INJECTION, SUSPENSION INTRA-ARTICULAR; INTRALESIONAL; INTRAMUSCULAR; SOFT TISSUE
Status: DISCONTINUED | OUTPATIENT
Start: 2019-05-29 | End: 2019-05-29 | Stop reason: HOSPADM

## 2019-05-29 RX ORDER — HYDROCODONE BITARTRATE AND ACETAMINOPHEN 10; 325 MG/1; MG/1
1 TABLET ORAL 3 TIMES DAILY PRN
Qty: 20 TABLET | Refills: 0 | Status: SHIPPED | OUTPATIENT
Start: 2019-05-29 | End: 2019-06-26 | Stop reason: SDUPTHER

## 2019-05-29 RX ORDER — BUPIVACAINE HYDROCHLORIDE 2.5 MG/ML
INJECTION, SOLUTION EPIDURAL; INFILTRATION; INTRACAUDAL
Status: DISCONTINUED | OUTPATIENT
Start: 2019-05-29 | End: 2019-05-29 | Stop reason: HOSPADM

## 2019-05-29 RX ORDER — ALPRAZOLAM 0.5 MG/1
1 TABLET, ORALLY DISINTEGRATING ORAL ONCE AS NEEDED
Status: COMPLETED | OUTPATIENT
Start: 2019-05-29 | End: 2019-05-29

## 2019-05-29 RX ORDER — LIDOCAINE HYDROCHLORIDE 10 MG/ML
INJECTION, SOLUTION EPIDURAL; INFILTRATION; INTRACAUDAL; PERINEURAL
Status: DISCONTINUED | OUTPATIENT
Start: 2019-05-29 | End: 2019-05-29 | Stop reason: HOSPADM

## 2019-05-29 RX ADMIN — ALPRAZOLAM 1 MG: 0.5 TABLET, ORALLY DISINTEGRATING ORAL at 01:05

## 2019-05-29 NOTE — DISCHARGE INSTRUCTIONS
Recovery After Procedural Sedation (Adult)  You have been given medicine by vein to make you sleep during your surgery. This may have included both a pain medicine and sleeping medicine. Most of the effects have worn off. But you may still have some drowsiness for the next 6 to 8 hours.  Home care  Follow these guidelines when you get home:  · For the next 8 hours, you should be watched by a responsible adult. This person should make sure your condition is not getting worse.  · Don't drink any alcohol for the next 24 hours.  · Don't drive, operate dangerous machinery, or make important business or personal decisions during the next 24 hours.  Note: Your healthcare provider may tell you not to take any medicine by mouth for pain or sleep in the next 4 hours. These medicines may react with the medicines you were given in the hospital. This could cause a much stronger response than usual.  Follow-up care  Follow up with your healthcare provider if you are not alert and back to your usual level of activity within 12 hours.  When to seek medical advice  Call your healthcare provider right away if any of these occur:  · Drowsiness gets worse  · Weakness or dizziness gets worse  · Repeated vomiting  · You can't be awakened   Date Last Reviewed: 10/18/2016  © 5845-7362 [employer]. [address] [place] [address], [place] [address]. All rights reserved. This information is not intended as a substitute for professional medical care. Always follow your healthcare professional's instructions.      Home care instructions  Apply ice pack to the injection site for 20 minutes periods for the first 24 hrs for soreness/discomfort at injection site DO NOT USE HEAT FOR 24 HOURS  Keep site clean and dry for 24 hours, remove bandaid when desired  Do not drive until tomorrow  Take care when walking after a lumbar injection  Avoid strenuous activities for 2 days  Make take 2 weeks to feel the full effects   Resume home medication  as prescribed today  Resume Aspirin, Plavix, or Coumadin the day after the procedure unless otherwise instructed.    SEE IMMEDIATE MEDICAL HELP FOR:  Severe increase in your usual pain or appearance of new pain  Prolonged or increasing weakness or numbness in the legs or arms  Drainage, redness, active bleeding, or increased swelling at the injection site  Temperature over 100.0 degrees F.  Headache that increases when your head is upright and decreases when you lie flat    CALL 911 OR GO DIRECTLY TO EMERGENCY DEPARTMENT FOR:  Shortness of breath, chest pain, or problems breathing

## 2019-05-29 NOTE — OP NOTE
PROCEDURE DATE: 5/29/2019    PROCEDURE: Right L4/5 transforaminal epidural steroid injection under fluoroscopy    DIAGNOSIS: Lumbar  Radiculopathy    Post op diagnosis: Same    PHYSICIAN: Richy Bolden MD    MEDICATIONS INJECTED:  Methylprednisolone 40mg (0.5ml) and 1.5ml 0.25% bupivicaine at each nerve root.     LOCAL ANESTHETIC INJECTED:  Lidocaine 1%. 4 ml per site.    SEDATION MEDICATIONS: none    ESTIMATED BLOOD LOSS:  none    COMPLICATIONS:  none    TECHNIQUE:   A time-out was taken to identify patient and procedure side prior to starting the procedure. The patient was placed in a prone position, prepped and draped in the usual sterile fashion using ChloraPrep and sterile towels.  The area to be injected was determined under fluoroscopic guidance in AP and oblique view.  Local anesthetic was given by raising a wheal and going down to the hub of a 25-gauge 1.5 inch needle.  In oblique view, a 5 inch 22-gauge bent-tip spinal needle was introduced towards 6 oclock position of the pedicle of each above named nerve root level.  The needle was walked medially then hinged into the neural foramen and position was confirmed in AP and lateral views.  Omnip contrast dye was injected to confirm appropriate placement and that there was no vascular uptake.  After negative aspiration for blood or CSF, the medication was then injected. This was performed at the right L4/5 level(s). The patient tolerated the procedure well.    The patient was monitored after the procedure.  Patient was given post procedure and discharge instructions to follow at home. The patient was discharged in a stable condition.

## 2019-06-26 ENCOUNTER — OFFICE VISIT (OUTPATIENT)
Dept: PAIN MEDICINE | Facility: CLINIC | Age: 64
End: 2019-06-26
Payer: MEDICARE

## 2019-06-26 VITALS
RESPIRATION RATE: 20 BRPM | TEMPERATURE: 98 F | BODY MASS INDEX: 36.96 KG/M2 | SYSTOLIC BLOOD PRESSURE: 163 MMHG | OXYGEN SATURATION: 98 % | DIASTOLIC BLOOD PRESSURE: 81 MMHG | WEIGHT: 229 LBS | HEART RATE: 72 BPM

## 2019-06-26 DIAGNOSIS — G57.11 MERALGIA PARESTHETICA OF RIGHT SIDE: ICD-10-CM

## 2019-06-26 DIAGNOSIS — M47.816 LUMBAR SPONDYLOSIS: Primary | ICD-10-CM

## 2019-06-26 DIAGNOSIS — M51.36 DDD (DEGENERATIVE DISC DISEASE), LUMBAR: ICD-10-CM

## 2019-06-26 PROCEDURE — 99999 PR PBB SHADOW E&M-EST. PATIENT-LVL III: CPT | Mod: PBBFAC,,, | Performed by: ANESTHESIOLOGY

## 2019-06-26 PROCEDURE — 3008F BODY MASS INDEX DOCD: CPT | Mod: S$GLB,,, | Performed by: ANESTHESIOLOGY

## 2019-06-26 PROCEDURE — 1101F PT FALLS ASSESS-DOCD LE1/YR: CPT | Mod: S$GLB,,, | Performed by: ANESTHESIOLOGY

## 2019-06-26 PROCEDURE — 3079F PR MOST RECENT DIASTOLIC BLOOD PRESSURE 80-89 MM HG: ICD-10-PCS | Mod: S$GLB,,, | Performed by: ANESTHESIOLOGY

## 2019-06-26 PROCEDURE — 3077F SYST BP >= 140 MM HG: CPT | Mod: S$GLB,,, | Performed by: ANESTHESIOLOGY

## 2019-06-26 PROCEDURE — 1101F PR PT FALLS ASSESS DOC 0-1 FALLS W/OUT INJ PAST YR: ICD-10-PCS | Mod: S$GLB,,, | Performed by: ANESTHESIOLOGY

## 2019-06-26 PROCEDURE — 99999 PR PBB SHADOW E&M-EST. PATIENT-LVL III: ICD-10-PCS | Mod: PBBFAC,,, | Performed by: ANESTHESIOLOGY

## 2019-06-26 PROCEDURE — 3077F PR MOST RECENT SYSTOLIC BLOOD PRESSURE >= 140 MM HG: ICD-10-PCS | Mod: S$GLB,,, | Performed by: ANESTHESIOLOGY

## 2019-06-26 PROCEDURE — 3079F DIAST BP 80-89 MM HG: CPT | Mod: S$GLB,,, | Performed by: ANESTHESIOLOGY

## 2019-06-26 PROCEDURE — 99214 PR OFFICE/OUTPT VISIT, EST, LEVL IV, 30-39 MIN: ICD-10-PCS | Mod: S$GLB,,, | Performed by: ANESTHESIOLOGY

## 2019-06-26 PROCEDURE — 3008F PR BODY MASS INDEX (BMI) DOCUMENTED: ICD-10-PCS | Mod: S$GLB,,, | Performed by: ANESTHESIOLOGY

## 2019-06-26 PROCEDURE — 99214 OFFICE O/P EST MOD 30 MIN: CPT | Mod: S$GLB,,, | Performed by: ANESTHESIOLOGY

## 2019-06-26 RX ORDER — HYDROCODONE BITARTRATE AND ACETAMINOPHEN 10; 325 MG/1; MG/1
1 TABLET ORAL 3 TIMES DAILY PRN
Qty: 20 TABLET | Refills: 0 | Status: SHIPPED | OUTPATIENT
Start: 2019-06-26 | End: 2019-08-08 | Stop reason: SDUPTHER

## 2019-06-26 NOTE — PROGRESS NOTES
This note was completed with dictation software and grammatical errors may exist.    CC: Back pain, right anterior thigh pain    HPI:  The patient is a 63-year-old woman with a history of hypertension, presents in [facility] for back and right anterior thigh pain. She returns in follow-up today for right anterior thigh pain.  During the last visit we had reviewed her MRI which was fairly benign but perhaps did show some very slight foraminal narrowing out to the right side at L4/5.  We decided to proceed with a transforaminal injection and I also gave her prescription for gabapentin 300 mg nightly.  She had been scheduled for any EMG by Dr. Hong in Neurology but she states that she did not want to do any unnecessary testing.  We decided that we would go ahead with the transforaminal injection to see if it helped but if not providing benefit that we may proceed with the EMG.  She is status post right L4/5 transforaminal injection on 05/29/2019 with no real change in her pain. She has been taking the gabapentin every night and she is not able to tell us if the pain is better.  However, she states that her back actually hurts more than her right leg.  She is no longer having shooting pains into her lower leg.  The pain gets problem is that she fell several days ago onto her face and has chest wall pain, facial pain and her back hurts worse now.  Her back pain is across both sides, worse with bending and twisting.  She also is complaining of chronic abdominal pain, has not been eating much but this is a long term issue, not recent.          Pain intervention history:  She had taken hydrocodone without much relief, has tried ibuprofen, Tylenol without relief.  She tried Flexeril without much relief.  She has been doing some exercises on her own at home, trying to walk without relief of pain.    ROS:  She reports fatigability, weight gain, itching, vision change, eye pain,  runny nose, shortness of breath, chest pain, cough, stomach pain, easy bruising, back pain, difficulty sleeping and anxiety.  Balance of review of systems is negative.    Past Medical History:   Diagnosis Date    GERD (gastroesophageal reflux disease)     Hypertension        Past Surgical History:   Procedure Laterality Date    HYSTERECTOMY      Injection,steroid,epidural,transforaminal approach L4/5 Right 5/29/2019    Performed by [facility]y Bolden [facility]       Social History     Socioeconomic History    Marital status: Single     Spouse name: Not on file    Number of children: Not on file    Years of education: Not on file    Highest education level: Not on file   Occupational History    Not on file   Social Needs    Financial resource strain: Not on file    Food insecurity:     Worry: Not on file     Inability: Not on file    Transportation needs:     Medical: Not on file     Non-medical: Not on file   Tobacco Use    Smoking status: Never Smoker    Smokeless tobacco: Never Used   Substance and Sexual Activity    Alcohol use: Yes     Alcohol/week: 0.0 oz     Comment: Socially    Drug use: No    Sexual activity: Not on file   Lifestyle    Physical activity:     Days per week: Not on file     Minutes per session: Not on file    Stress: Not on file   Relationships    Social connections:     Talks on phone: Not on file     Gets together: Not on file     Attends Taoist service: Not on file     Active member of club or organization: Not on file     Attends meetings of clubs or organizations: Not on file     Relationship status: Not on file   Other Topics Concern    Not on file   Social History Narrative    Not on file         Medications/Allergies: See med card    Vitals:    06/26/19 0822   BP: (!) 163/81   Pulse: 72   Resp: 20   Temp: 98 °F (36.7 °C)   TempSrc: Oral   SpO2: 98%   Weight: 103.9 kg (229 lb)   PainSc:   6   PainLoc: Chest     Body mass index is 36.96  kg/m².        Physical exam:  Gen: A and O x3, pleasant, well-groomed  Skin: No rashes.  She has ecchymoses around her left eye, no scleral hemorrhage or lesions.  HEENT: PERRLA, no obvious deformities on ears or in canals. Trachea midline.  Cranial nerves 2-12 grossly intact.  CVS: Regular rate and rhythm, normal palpable pulses.  Resp:No increased work of breathing, symmetrical chest rise.  Abdomen: Soft, NT/ND.  Musculoskeletal: . No antalgic gait.     Neuro:  Lower extremities: 5/5 strength bilaterally  Reflexes: Patellar 0+, Achilles 0+ bilaterally.  Sensory:  Intact and symmetrical to light touch and pinprick in L2-S1 dermatomes bilaterally except for anterior thigh on the right side decreased sensation to light touch and pinprick but also hyperalgesia in the same area.    Lumbar spine:  Lumbar spine:  Range of motion is full with flexion without increased pain, mildly reduced with extension with increased pain especially on right oblique extension causing right low back pain.  Andre's test causes no increased pain on either side.    Supine straight leg raise is negative bilaterally.    Internal and external rotation of the hip causes only back pain.  Myofascial exam: No tenderness to palpation across lumbar paraspinous muscles.    Imagin/10/19 MRI L-spine:  The conus terminates at L1 and has an unremarkable appearance.  There is diffuse disc desiccation.  There is loss of disc space height at L2/L3 and L3/L4.  There is mild anterior osteophyte formation throughout the lumbar region.  The lumbar vertebrae otherwise display normal signal, morphology and alignment.  The individual disc levels appears follows:  T12/L1, L1/L2: There is no evidence of disc protrusion, canal or foraminal stenosis at these levels.  L2/L3: Minimal degenerative facet changes are present and there is no evidence of significant canal or foraminal stenosis.  L3/L4: There is slight disc bulging and mild degenerative facet changes  are noted without evidence of significant canal or foraminal stenosis.  L4/L5: Annular disc bulging is evident with a small left posterolateral disc protrusion.  There is mild narrowing the left foramen.  The central canal is mildly distorted and the right foramen is mildly narrowed.  L5/S1: Degenerative facet changes are noted and there is no evidence of significant canal or foraminal stenosis.    4/10/19 Xray L-spine:  Lower lumbar degenerative facet changes are present.  There is mild anterior osteophyte formation throughout the lumbar region.  There is no evidence of abnormal motion upon flexion and extension.  There is a slight retrolisthesis of L2 relative to L3 there is a slight dextroscoliosis.    Assessment:  The patient is a 63-year-old woman with a history of hypertension, presents in referral [facility] for back and right anterior thigh pain.   1. Lumbar spondylosis     2. DDD (degenerative disc disease), lumbar     3. Meralgia paresthetica of right side         Plan:  1.  I think that the Gabapentin may have helped slightly, she is not having as severe of pain in her right thigh and actually states that her low back hurts more than her leg.  This also could be due to her fall and this has worsened her back pain so now maybe that is more prominent that her right leg pain.  Nonetheless, since she did not have any major change with the injection, I would tend to think that the L4 is not issue and perhaps she has meralgia paresthetica.  I told her to contact me in about three weeks to let me know if her back or leg are bothering her much, if it is her leg we will set up a lateral femoral cutaneous nerve block.  If her back is hurting more, we would set up medial branch blocks and possibly set her up for physical therapy as well. We had reviewed her lumbar spine MRI again which shows some mild lateral recess narrowing off to the right side at L4/5 and slight foraminal  narrowing off to the right at L4/5 and also facet degenerative changes at L4/5 and L5/S1.  For now I will have her continue taking gabapentin, I have also provided her with several more tablets for hydrocodone to use sparingly.

## 2019-08-12 ENCOUNTER — TELEPHONE (OUTPATIENT)
Dept: NEUROLOGY | Facility: CLINIC | Age: 64
End: 2019-08-12

## 2019-08-12 NOTE — TELEPHONE ENCOUNTER
----- Message from Shad Wu sent at 8/12/2019  2:06 PM CDT -----  Contact: same  FYI--Patient called in and stated she needs to cancel her EMG that is scheduled for 9/20/19 & will call us back if she feels the need to reschedule.

## 2019-08-13 ENCOUNTER — TELEPHONE (OUTPATIENT)
Dept: NEUROSURGERY | Facility: CLINIC | Age: 64
End: 2019-08-13

## 2019-08-13 PROBLEM — Z00.00 WELL ADULT EXAM: Status: ACTIVE | Noted: 2019-08-13

## 2019-08-13 PROBLEM — R41.3 MEMORY CHANGES: Status: ACTIVE | Noted: 2019-08-13

## 2019-08-13 NOTE — TELEPHONE ENCOUNTER
Can we please reschedule the EMG? Pt wanted a follow up in neurosurgery, but needs to have this done first. I can call her with the appt info once scheduled. Thank you!

## 2019-08-20 DIAGNOSIS — M54.16 LUMBAR RADICULOPATHY: Primary | ICD-10-CM

## 2019-09-10 ENCOUNTER — TELEPHONE (OUTPATIENT)
Dept: NEUROSURGERY | Facility: CLINIC | Age: 64
End: 2019-09-10

## 2019-09-10 NOTE — TELEPHONE ENCOUNTER
Patient called asking about her EMG scheduled for 9/20. I informed patient that it looks like test was cancelled by her. I asked to transfer patient to neurology to reschedule and patient stated that she wasn't worried about it at this time.

## 2019-10-07 ENCOUNTER — TELEPHONE (OUTPATIENT)
Dept: NEUROSURGERY | Facility: CLINIC | Age: 64
End: 2019-10-07

## 2019-10-07 NOTE — TELEPHONE ENCOUNTER
Pt called inquiring about an appt she got a text about. I let the pt know that she was not scheduled for any appts with us.

## 2019-10-22 ENCOUNTER — HOSPITAL ENCOUNTER (OUTPATIENT)
Dept: RADIOLOGY | Facility: HOSPITAL | Age: 64
Discharge: HOME | End: 2019-10-22
Attending: SPECIALIST
Payer: MEDICARE

## 2019-10-22 DIAGNOSIS — R74.01 NONSPECIFIC ELEVATION OF LEVELS OF TRANSAMINASE OR LACTIC ACID DEHYDROGENASE (LDH): ICD-10-CM

## 2019-10-22 DIAGNOSIS — R10.13 ABDOMINAL PAIN, EPIGASTRIC: ICD-10-CM

## 2019-10-22 DIAGNOSIS — R93.2 ABNORMAL ULTRASOUND OF LIVER: ICD-10-CM

## 2019-10-22 DIAGNOSIS — K76.0 STEATOSIS OF LIVER: ICD-10-CM

## 2019-10-22 DIAGNOSIS — K57.30 DIVERTICULOSIS OF LARGE INTESTINE WITHOUT DIVERTICULITIS: ICD-10-CM

## 2019-10-22 DIAGNOSIS — R74.02 NONSPECIFIC ELEVATION OF LEVELS OF TRANSAMINASE OR LACTIC ACID DEHYDROGENASE (LDH): ICD-10-CM

## 2019-10-22 PROCEDURE — 76700 US EXAM ABDOM COMPLETE: CPT | Mod: 26,,, | Performed by: RADIOLOGY

## 2019-10-22 PROCEDURE — 76700 US ABDOMEN COMPLETE: ICD-10-PCS | Mod: 26,,, | Performed by: RADIOLOGY

## 2019-10-22 PROCEDURE — 76700 US EXAM ABDOM COMPLETE: CPT | Mod: TC,PO

## 2019-11-18 PROBLEM — Z00.00 WELL ADULT EXAM: Status: RESOLVED | Noted: 2019-08-13 | Resolved: 2019-11-18

## 2020-02-12 DIAGNOSIS — R41.3 POOR SHORT TERM MEMORY: Primary | ICD-10-CM

## 2020-02-20 ENCOUNTER — HOSPITAL ENCOUNTER (OUTPATIENT)
Dept: RADIOLOGY | Facility: HOSPITAL | Age: 65
Discharge: HOME | End: 2020-02-20
Attending: PSYCHIATRY & NEUROLOGY
Payer: MEDICARE

## 2020-02-20 DIAGNOSIS — R41.3 POOR SHORT TERM MEMORY: ICD-10-CM

## 2020-02-20 PROCEDURE — 25500020 PHARM REV CODE 255: Performed by: PSYCHIATRY & NEUROLOGY

## 2020-02-20 PROCEDURE — 70553 MRI BRAIN W WO CONTRAST: ICD-10-PCS | Mod: 26,,, | Performed by: RADIOLOGY

## 2020-02-20 PROCEDURE — 70553 MRI BRAIN STEM W/O & W/DYE: CPT | Mod: TC

## 2020-02-20 PROCEDURE — 70553 MRI BRAIN STEM W/O & W/DYE: CPT | Mod: 26,,, | Performed by: RADIOLOGY

## 2020-02-20 PROCEDURE — A9585 GADOBUTROL INJECTION: HCPCS | Performed by: PSYCHIATRY & NEUROLOGY

## 2020-02-20 RX ORDER — GADOBUTROL 604.72 MG/ML
10 INJECTION INTRAVENOUS
Status: COMPLETED | OUTPATIENT
Start: 2020-02-20 | End: 2020-02-20

## 2020-02-20 RX ADMIN — GADOBUTROL 10 ML: 604.72 INJECTION INTRAVENOUS at 12:02

## 2020-03-02 PROBLEM — Z79.899 ENCOUNTER FOR LONG-TERM (CURRENT) USE OF MEDICATIONS: Status: ACTIVE | Noted: 2020-03-02

## 2020-03-02 PROBLEM — F41.9 ANXIETY AND DEPRESSION: Status: ACTIVE | Noted: 2020-03-02

## 2020-03-02 PROBLEM — F32.A ANXIETY AND DEPRESSION: Status: ACTIVE | Noted: 2020-03-02

## 2020-11-09 PROBLEM — K21.9 GERD WITHOUT ESOPHAGITIS: Status: ACTIVE | Noted: 2020-11-09

## 2020-11-09 PROBLEM — Z00.00 WELL ADULT EXAM: Status: ACTIVE | Noted: 2020-11-09

## 2020-11-09 PROBLEM — R79.89 HIGH SERUM LOW DENSITY LIPOPROTEIN (LDL) CHOLESTEROL: Status: ACTIVE | Noted: 2020-11-09

## 2020-11-09 PROBLEM — Z12.11 SCREEN FOR COLON CANCER: Status: ACTIVE | Noted: 2020-11-09

## 2020-11-19 PROBLEM — E66.01 CLASS 3 SEVERE OBESITY DUE TO EXCESS CALORIES WITH SERIOUS COMORBIDITY AND BODY MASS INDEX (BMI) OF 40.0 TO 44.9 IN ADULT: Status: ACTIVE | Noted: 2020-11-19

## 2020-11-19 PROBLEM — R53.81 PHYSICAL DECONDITIONING: Status: ACTIVE | Noted: 2020-11-19

## 2021-01-20 PROBLEM — K82.8 BILIARY DYSKINESIA: Status: ACTIVE | Noted: 2021-01-20

## 2021-02-08 PROBLEM — Z00.00 WELL ADULT EXAM: Status: RESOLVED | Noted: 2020-11-09 | Resolved: 2021-02-08

## 2021-02-24 ENCOUNTER — IMMUNIZATION (OUTPATIENT)
Dept: FAMILY MEDICINE | Facility: CLINIC | Age: 66
End: 2021-02-24
Payer: MEDICARE

## 2021-02-24 DIAGNOSIS — Z23 NEED FOR VACCINATION: Primary | ICD-10-CM

## 2021-02-24 PROCEDURE — 91300 COVID-19, MRNA, LNP-S, PF, 30 MCG/0.3 ML DOSE VACCINE: CPT | Mod: PBBFAC | Performed by: INTERNAL MEDICINE

## 2021-03-17 ENCOUNTER — IMMUNIZATION (OUTPATIENT)
Dept: FAMILY MEDICINE | Facility: CLINIC | Age: 66
End: 2021-03-17
Payer: MEDICARE

## 2021-03-17 DIAGNOSIS — Z23 NEED FOR VACCINATION: Primary | ICD-10-CM

## 2021-03-17 PROCEDURE — 0002A COVID-19, MRNA, LNP-S, PF, 30 MCG/0.3 ML DOSE VACCINE: CPT | Mod: CV19,,, | Performed by: INTERNAL MEDICINE

## 2021-03-17 PROCEDURE — 0002A COVID-19, MRNA, LNP-S, PF, 30 MCG/0.3 ML DOSE VACCINE: ICD-10-PCS | Mod: CV19,,, | Performed by: INTERNAL MEDICINE

## 2021-03-17 PROCEDURE — 91300 COVID-19, MRNA, LNP-S, PF, 30 MCG/0.3 ML DOSE VACCINE: ICD-10-PCS | Mod: ,,, | Performed by: INTERNAL MEDICINE

## 2021-03-17 PROCEDURE — 91300 COVID-19, MRNA, LNP-S, PF, 30 MCG/0.3 ML DOSE VACCINE: CPT | Mod: ,,, | Performed by: INTERNAL MEDICINE

## 2021-10-29 ENCOUNTER — TELEPHONE (OUTPATIENT)
Dept: FAMILY MEDICINE | Facility: CLINIC | Age: 66
End: 2021-10-29
Payer: MEDICARE

## 2021-12-10 PROBLEM — E87.20 LACTIC ACIDOSIS: Status: ACTIVE | Noted: 2021-12-10

## 2021-12-10 PROBLEM — F10.10 ALCOHOL ABUSE: Status: ACTIVE | Noted: 2021-12-10

## 2021-12-10 PROBLEM — Z71.89 ACP (ADVANCE CARE PLANNING): Status: ACTIVE | Noted: 2021-12-10

## 2021-12-10 PROBLEM — D72.829 LEUKOCYTOSIS: Status: ACTIVE | Noted: 2021-12-10

## 2022-04-07 PROBLEM — K22.2 ESOPHAGEAL STENOSIS: Status: ACTIVE | Noted: 2022-04-07

## 2022-04-07 PROBLEM — R07.9 CHEST PAIN: Status: ACTIVE | Noted: 2022-04-07

## 2022-04-08 PROBLEM — M94.0 ACUTE COSTOCHONDRITIS: Status: ACTIVE | Noted: 2022-04-08

## (undated) DEVICE — TRAY NERVE BLOCK

## (undated) DEVICE — NDL SPINAL SPINOCAN 22GX3.5

## (undated) DEVICE — SEE MEDLINE ITEM 152622

## (undated) DEVICE — GLOVE SURGICAL LATEX SZ 7

## (undated) DEVICE — MARKER SKIN STND TIP BLUE BARR

## (undated) DEVICE — APPLICATOR CHLORAPREP CLR 10.5